# Patient Record
Sex: MALE | Race: WHITE
[De-identification: names, ages, dates, MRNs, and addresses within clinical notes are randomized per-mention and may not be internally consistent; named-entity substitution may affect disease eponyms.]

---

## 2017-03-27 ENCOUNTER — HOSPITAL ENCOUNTER (EMERGENCY)
Dept: HOSPITAL 39 - ER | Age: 57
Discharge: HOME | End: 2017-03-27
Payer: COMMERCIAL

## 2017-03-27 ENCOUNTER — HOSPITAL ENCOUNTER (OUTPATIENT)
Dept: HOSPITAL 39 - MRI | Age: 57
Discharge: HOME | End: 2017-03-27
Attending: FAMILY MEDICINE
Payer: COMMERCIAL

## 2017-03-27 VITALS — DIASTOLIC BLOOD PRESSURE: 88 MMHG | OXYGEN SATURATION: 97 % | SYSTOLIC BLOOD PRESSURE: 135 MMHG

## 2017-03-27 VITALS — TEMPERATURE: 98 F

## 2017-03-27 DIAGNOSIS — M51.16: Primary | ICD-10-CM

## 2017-03-27 DIAGNOSIS — Z86.14: ICD-10-CM

## 2017-03-27 DIAGNOSIS — M54.5: Primary | ICD-10-CM

## 2017-03-27 DIAGNOSIS — Z98.1: ICD-10-CM

## 2017-03-27 NOTE — MRI
Study: MRI of the Lumbar Spine. 



Indication: LOW BACK PAIN  



Technique: Multiplanar, multi sequence MRI of the lumbar spine

was obtained without intravenous contrast.

 

Comparison: February 23, 2016.



Findings:



L3-S1 bilateral pedicle screws with posterior vertical

stabilization rods as well as L3-L4, L4-L5, L5-S1 interbody

grafts and laminectomies redemonstrated. There is pronounced

susceptibility artifact.



No acute vertebral body fracture identified. No marrow

infiltrating lesion. Conus normal terminating at superior L1

level.



L1-L2: Trace retrolisthesis. Moderate disc space height loss and

disc desiccation with a 3 mm disc bulge. Mild bilateral neural

foraminal narrowing stable. No spinal canal narrowing. Stable

mild bilateral lateral recess narrowing.



L2-L3: Progressed severe disc space height loss and disc

desiccation with remodeling of the endplates. Discogenic endplate

changes suspected but difficult to evaluate due to the

susceptibility artifact. In addition, there does appear to be

progressive cystic change along the right posterolateral margin

of the L2 inferior endplate. A progressed 5 mm disc osteophyte

complex noted in addition moderate to severe bilateral facet

arthrosis and mild ligament and buckling. Prominent spurring of

the anteromedial margin right facet joint noted with marked

flattening of the thecal sac in the transverse dimension. There

is resultant moderate to severe spinal canal narrowing. Severe

bilateral neural foraminal narrowing noted as well.



L3-L4: No new complicating features.



L4-L5: Trace anterolisthesis with stable mild to moderate right

and mild left neural foraminal narrowing. No spinal canal

narrowing.



L5-S1: The posterior margin of the interbody graft extends into

the left neural foramen. There is severe left and at least

moderate to severe right neural foraminal narrowing. No spinal

canal narrowing.



Impression: 



L3-S1 fusion construct and posterior decompression as above with

associated susceptibility artifact which does degrade the

examination.



L5-S1 interbody graft extending into the left neural foramen with

severe left and moderate to severe right neural foraminal

narrowing.



L4-L5 stable mild-to-moderate right and mild left neural

foraminal narrowing.



L2-L3 progress disc disease as above with progressive moderate to

severe spinal canal narrowing and severe bilateral neural

foraminal narrowing. Endplate changes at this level are

nonspecific. Correlation with ESR and CRP recommended as

discitis-osteomyelitis not excluded.



Additional findings as above.



Electronically signed by:  Timo Palma MD  3/27/2017 3:36 PM CDT

## 2017-03-27 NOTE — ED.PDOC
History of Present Illness





- General


Chief Complaint: Back Pain or Injury


Stated Complaint: back pain hx of lumbar fractures


Time Seen by Provider: 03/27/17 14:45


Source: patient, RN notes reviewed, Vital Signs reviewed, family


Exam Limitations: no limitations





- History of Present Illness


Initial Comments: 


Patient with significant back history including 2 lumbar fusions has noticed 

over the past 5 weeks progressive worsening of his pain and weakness in his 

legs. He has some residual weakness in his L leg from lumbar surgery in 2012. 

He is now having numbness and weakness in his R leg. Can't walk due to the 

weakness, leg just seems to "flop around". Had MRI done today as an outpatient. 

Will change to STAT read to see what is going on in his back.


Timing/Duration: getting worse, changing over time


Quality/Severity: dullness, other - numbness


Back Pain Location: lumbar spine


Back Pain Radiation: upper legs, lower legs, feet


Method of Injury/Prior Injury: other - Hx of lumbar fusions L3-S1


Improving Factors: nothing


Worsening Factors: movement


Associated Symptoms: weakness, numbness in legs/feet, tingling in legs/feet, 

sensory/motor loss, lower back pain, other - no loss of bowel or bladder control


Allergies/Adverse Reactions: 


Allergies





NO KNOWN ALLERGY Allergy (Verified 03/27/17 15:26)


 








Home Medications: 


Ambulatory Orders





HYDROcodone 10MG/APAP 325MG [Norco 10/325] 1 ea PO 03/27/17 


Lyrica 600 mg PO BID 03/27/17 


Tramadol HCl [Tramadol HCl ER] 100 mg PO 03/27/17 


methylPREDNISolone TAB [Medrol Tab] 4 mg PO DAILY #1 pack 03/27/17 











Review of Systems





- Review of Systems


Constitutional: States: no symptoms reported


EENTM: States: no symptoms reported


Respiratory: States: no symptoms reported


Cardiology: States: no symptoms reported


Gastrointestinal/Abdominal: States: no symptoms reported


Genitourinary: States: no symptoms reported


Musculoskeletal: States: see HPI, back pain


Skin: States: no symptoms reported


Neurological: States: see HPI, numbness, paresthesia, pre-existing deficit, 

weakness


Endocrine: States: no symptoms reported


Hematologic/Lymphatic: States: no symptoms reported





Past Medical History (General)





- Patient Medical History


Hx Seizures: No


Hx Stroke: No


Hx Dementia: No


Hx Asthma: No


Hx of COPD: No


Hx Cardiac Disorders: No


Hx Congestive Heart Failure: No


Hx Pacemaker: No


Hx Hypertension: No


Hx Thyroid Disease: No


Hx Diabetes: No


Hx Gastroesophageal Reflux: No


Hx Renal Disease: No


Hx Cancer: No


Hx of HIV: No


Hx Hepatitis C: No


Hx MRSA: Yes


MRSA Source:: Wound





- Vaccination History


Hx Tetanus, Diphtheria Vaccination: Yes


Hx Influenza Vaccination: Yes


Hx Pneumococcal Vaccination: Yes


Immunizations Up to Date: No





- Social History


Hx Tobacco Use: No


Hx Chewing Tobacco Use: No


Hx Alcohol Use: No


Hx Substance Use: No


Hx Substance Use Treatment: No


Hx Depression: No


Feels Threatened In Home Enviroment: No


Feels Threatened In a Relationship: No


Hx Physical Abuse: No


Hx Emotional Abuse: No


Hx Suspected Abuse: No





- Female History


Patient is a Female of Child Bearing Age (10 -59 yrs old): No


Patient Pregnant: No





Family Medical History





- Family History


  ** Mother


Family History: No Known


Living Status: Unknown





Physical Exam





- Physical Exam


General Appearance: Alert, No apparent distress, Well Developed, Well Groomed, 

Well Hydrated, Well Nourished


Cardiovascular/Respiratory: regular rate, rhythm, no M/R/G, normal breath sounds

, no respiratory distress


Peripheral Pulses: dorsalis pedis,right: 2+, dorsalis pedis,left: 2+


Extremity Exam: no evidence of injury


Neurologic: alert, normal mood/affect, oriented x 3, motor weakness - 

Significant weakness of flexion/extension of R foot and leg 2/5, L 4/5, sensory 

deficit - decreased sensation to light touch Bilateral lower legs - diffusely., 

depressed affect, other - DTR's - R patellar 2+, L patellar 0 (unchanged per 

patient)


Skin Exam: normal color, warm/dry


Comments: 


 Vital Signs - 24 hr











  03/27/17





  15:03


 


Temperature 98 F


 


Pulse Rate [ 79





Left Radial] 


 


Respiratory 20





Rate 


 


Blood Pressure 155/99





[Left Arm] 


 


O2 Sat by Pulse 99





Oximetry 














Progress





- Progress


Progress: 


03/27/17 15:54


Placed call to Dr. Alatorre, patients Neurosurgeon, awaiting call back.





03/27/17 16:39


Second call placed, he is in surgery.





03/27/17 17:45


Discussed with Dr. Alatorre who agrees with rapid follow up with him. Recommended 

Decadron 6mg IV now, Medrol Dose Pk to start tomorrow and continue Lyrica 300mg 

TID. Patient is to call Dr. Alatorre' office in AM to schedule appt.





- Results/Orders


Results/Orders: 


MRI: L2-3 - moderate to severe spinal canal narrowing with severe bilateral 

neural foraminal narrowing.





Departure





- Departure


Clinical Impression: 


 Degeneration of lumbar intervertebral disc, Lumbar radiculopathy, acute


Time of Disposition: 17:48


Disposition: Discharge to Home or Self Care


Condition: Good


Departure Forms:  ED Discharge - Pt. Copy, Patient Portal Self Enrollment


Instructions:  DI for Low Back Pain


Diet: resume usual diet


Activity: increase activity as tolerated


Prescriptions: 


methylPREDNISolone TAB [Medrol Tab] 4 mg PO DAILY #1 pack


Home Medications: 


Ambulatory Orders





HYDROcodone 10MG/APAP 325MG [Norco 10/325] 1 ea PO 03/27/17 


Lyrica 600 mg PO BID 03/27/17 


Tramadol HCl [Tramadol HCl ER] 100 mg PO 03/27/17 


methylPREDNISolone TAB [Medrol Tab] 4 mg PO DAILY #1 pack 03/27/17 








Additional Instructions: 


Con't Lyrica


Call Dr. Alatorre' office in AM to schedule follow up.

## 2017-03-30 ENCOUNTER — HOSPITAL ENCOUNTER (OUTPATIENT)
Dept: HOSPITAL 39 - GMAL | Age: 57
Discharge: HOME | End: 2017-03-30
Attending: FAMILY MEDICINE
Payer: COMMERCIAL

## 2017-03-30 DIAGNOSIS — M25.50: Primary | ICD-10-CM

## 2017-12-08 ENCOUNTER — HOSPITAL ENCOUNTER (OUTPATIENT)
Dept: HOSPITAL 39 - GMAL | Age: 57
End: 2017-12-08
Attending: FAMILY MEDICINE
Payer: COMMERCIAL

## 2017-12-08 DIAGNOSIS — Z00.00: Primary | ICD-10-CM

## 2018-04-19 ENCOUNTER — HOSPITAL ENCOUNTER (OUTPATIENT)
Dept: HOSPITAL 39 - GMAL | Age: 58
End: 2018-04-19
Attending: FAMILY MEDICINE
Payer: COMMERCIAL

## 2018-04-19 DIAGNOSIS — E55.9: Primary | ICD-10-CM

## 2018-04-29 ENCOUNTER — HOSPITAL ENCOUNTER (EMERGENCY)
Dept: HOSPITAL 39 - ER | Age: 58
Discharge: HOME | End: 2018-04-29
Payer: COMMERCIAL

## 2018-04-29 VITALS — SYSTOLIC BLOOD PRESSURE: 153 MMHG | DIASTOLIC BLOOD PRESSURE: 91 MMHG | TEMPERATURE: 97.9 F

## 2018-04-29 VITALS — OXYGEN SATURATION: 100 %

## 2018-04-29 DIAGNOSIS — Z79.82: ICD-10-CM

## 2018-04-29 DIAGNOSIS — Z79.899: ICD-10-CM

## 2018-04-29 DIAGNOSIS — N13.2: ICD-10-CM

## 2018-04-29 DIAGNOSIS — Z98.1: ICD-10-CM

## 2018-04-29 DIAGNOSIS — M54.6: Primary | ICD-10-CM

## 2018-04-29 DIAGNOSIS — R19.5: ICD-10-CM

## 2018-04-29 DIAGNOSIS — J31.0: ICD-10-CM

## 2018-04-29 PROCEDURE — 85610 PROTHROMBIN TIME: CPT

## 2018-04-29 PROCEDURE — 82550 ASSAY OF CK (CPK): CPT

## 2018-04-29 PROCEDURE — 82553 CREATINE MB FRACTION: CPT

## 2018-04-29 PROCEDURE — 74177 CT ABD & PELVIS W/CONTRAST: CPT

## 2018-04-29 PROCEDURE — 85025 COMPLETE CBC W/AUTO DIFF WBC: CPT

## 2018-04-29 PROCEDURE — 93005 ELECTROCARDIOGRAM TRACING: CPT

## 2018-04-29 PROCEDURE — 80076 HEPATIC FUNCTION PANEL: CPT

## 2018-04-29 PROCEDURE — 81001 URINALYSIS AUTO W/SCOPE: CPT

## 2018-04-29 PROCEDURE — 84484 ASSAY OF TROPONIN QUANT: CPT

## 2018-04-29 PROCEDURE — 36415 COLL VENOUS BLD VENIPUNCTURE: CPT

## 2018-04-29 PROCEDURE — 82270 OCCULT BLOOD FECES: CPT

## 2018-04-29 PROCEDURE — 71046 X-RAY EXAM CHEST 2 VIEWS: CPT

## 2018-04-29 PROCEDURE — 80048 BASIC METABOLIC PNL TOTAL CA: CPT

## 2018-04-29 PROCEDURE — 85730 THROMBOPLASTIN TIME PARTIAL: CPT

## 2018-04-29 NOTE — ED.PDOC
History of Present Illness





- General


Chief Complaint: Back Pain or Injury


Stated Complaint: Severe Back Pain between shoulder blades


Time Seen by Provider: 04/29/18 09:37


Source: patient


Exam Limitations: no limitations





- History of Present Illness


Initial Comments: 





Hong Trinidad 58 y/o male stated that he had intermittent stabbing pain in 

between shoulder blades since last night and also left sided ache on his left 

abdomen same time,non radiating .No chest pain ,nausea/vomiting ,cough or 

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 

and last year lumbar spine fusion done.Denies heart problem ,high blood 

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 

passed it.Had N/V/D 3 days ago lasted for a day took Imodium for it.


Timing/Duration: 24 hours


Quality/Severity: sharpness


Back Pain Location: T-spine, lumbar spine


Back Pain Radiation: other - none


Method of Injury/Prior Injury: other - old injury


Improving Factors: rest


Worsening Factors: movement


Associated Symptoms: denies symptoms


Allergies/Adverse Reactions: 


Allergies





NO KNOWN ALLERGY Allergy (Verified 04/29/18 09:08)


 








Home Medications: 


Ambulatory Orders





HYDROcodone 10MG/APAP 325MG [[place] 10/325] 1 ea PO Q4HR 03/27/17 


Tramadol HCl [Tramadol HCl ER] 100 mg PO BID 03/27/17 


Acetamin W/Cod #3 Tab [Tylenol w/CODEINE #3] 2 ea PO Q6HRS PRN #20 tab 04/29/18 


Aspirin/Acetaminophen/Caffeine [Excedrin Migraine] 1 ea PO PRN PRN 04/29/18 


Escitalopram [Lexapro] 10 mg PO BEDTIME 04/29/18 


Pregabalin [Lyrica] 1 capsule PO BEDTIME 04/29/18 


Sucralfate Suspension [Carafate Suspension] 1 gm PO ACHS #300 ml 04/29/18 


Tamsulosin [Flomax] 0.4 mg PO QD #7 cap 04/29/18 


raNITIdine HCL [Zantac] 150 mg PO BID #60 tab 04/29/18 











Review of Systems





- Review of Systems


Constitutional: States: no symptoms reported


EENTM: States: nose congestion


Respiratory: States: no symptoms reported


Cardiology: States: no symptoms reported


Gastrointestinal/Abdominal: States: no symptoms reported


Genitourinary: States: no symptoms reported


Musculoskeletal: States: see HPI


Neurological: States: no symptoms reported


All other Systems: Reviewed and Negative, No Change from Baseline





Past Medical History (General)





- Patient Medical History


Hx Seizures: No


Hx Stroke: No


Hx Dementia: No


Hx Asthma: Yes - As a child


Hx of COPD: No


Hx Cardiac Disorders: No


Hx Congestive Heart Failure: No


Hx Pacemaker: No


Hx Hypertension: No


Hx Thyroid Disease: No


Hx Diabetes: No


Hx Gastroesophageal Reflux: No


Hx Renal Disease: No


Hx Cancer: No


Hx of HIV: No


Hx Hepatitis C: No


Hx MRSA: Yes


MRSA Source:: Wound


Surgical History: other - multiple lumbar sugery;ORIF right forearm fracture





- Vaccination History


Hx Tetanus, Diphtheria Vaccination: Yes


Hx Influenza Vaccination: No


Hx Pneumococcal Vaccination: Yes





- Social History


Hx Tobacco Use: No


Hx Chewing Tobacco Use: No


Hx Alcohol Use: Yes - Occas


Hx Substance Use: No


Hx Substance Use Treatment: No


Hx Depression: No


Feels Threatened In Home Enviroment: No


Feels Threatened In a Relationship: No


Hx Physical Abuse: No


Hx Emotional Abuse: No


Hx Suspected Abuse: No





- Female History


Patient Pregnant: No





Family Medical History





- Family History


  ** Mother


Family History: No Known


Living Status: Unknown


Hx Cardiac Disease: Yes - dad


Hx Family Cancer: Yes - breast-mom





Physical Exam





- Physical Exam


General Appearance: Alert, Comfortable, No apparent distress


Eyes, Ears, Nose, Throat Exam: normal ENT inspection


Neck Exam: non-tender, full range of motion, normal alignment, normal inspection


Cardiovascular/Respiratory: regular rate, rhythm, no M/R/G, normal peripheral 

pulses, no JVD


Peripheral Pulses: radial,right: 2+, radial,left: 2+


Gastrointestinal/Abdominal: normal bowel sounds, non tender, soft, no 

organomegaly


Back Exam: no CVA tenderness, no vertebral tenderness, muscle spasm, other - 

rectal exam-prostate moderately enlarged ;posive FOBT


Extremity Exam: normal range of motion, non-tender, no pedal edema


Neurologic: alert, oriented x 3, motor weakness - foot drop-left-residual from 

previous surgeries


Skin Exam: normal color, warm/dry





Progress





- Progress


Progress: 





04/29/18 09:50


 Vital Signs - 8 hr











  04/29/18





  09:29


 


Temperature 97.6 F


 


Pulse Rate [L 102 H





Arm] 


 


Respiratory 18





Rate 


 


Blood Pressure 144/100





[L Arm] 


 


O2 Sat by Pulse 100





Oximetry 














- EKG/XRAY/CT


EKG: Sinus, no ST T wave changes


Comments: NSR;HR-73


XRAY: chest - no acute cardiopulmonary process


CT Ordered: Yes - abd/p-3 mm UVJ right ureterolithiasis





Departure





- Departure


Clinical Impression: 


 Calculus of distal right ureter, Positive occult stool blood test, Rhinitis 

medicamentosa, History of back surgery





Back pain, acute


Qualifiers:


 Back pain location: thoracic back pain Back pain laterality: unspecified 

Qualified Code(s): M54.6 - Pain in thoracic spine





Time of Disposition: 12:48


Disposition: Discharge to Home or Self Care


Condition: Fair


Departure Forms:  ED Discharge - Pt. Copy, Patient Portal Self Enrollment


Instructions:  DI for Low Back Pain


Referrals: 


Oliver Hamm III, MD [Primary Care Provider] - 1-2 Weeks


Prescriptions: 


Acetamin W/Cod #3 Tab [Tylenol w/CODEINE #3] 2 ea PO Q6HRS PRN #20 tab


 PRN Reason: Pain


raNITIdine HCL [Zantac] 150 mg PO BID #60 tab


Sucralfate Suspension [Carafate Suspension] 1 gm PO ACHS #300 ml


Tamsulosin [Flomax] 0.4 mg PO QD #7 cap


Home Medications: 


Ambulatory Orders





HYDROcodone 10MG/APAP 325MG [[place] 10/325] 1 ea PO Q4HR 03/27/17 


Tramadol HCl [Tramadol HCl ER] 100 mg PO BID 03/27/17 


Acetamin W/Cod #3 Tab [Tylenol w/CODEINE #3] 2 ea PO Q6HRS PRN #20 tab 04/29/18 


Aspirin/Acetaminophen/Caffeine [Excedrin Migraine] 1 ea PO PRN PRN 04/29/18 


Escitalopram [Lexapro] 10 mg PO BEDTIME 04/29/18 


Pregabalin [Lyrica] 1 capsule PO BEDTIME 04/29/18 


Sucralfate Suspension [Carafate Suspension] 1 gm PO ACHS #300 ml 04/29/18 


Tamsulosin [Flomax] 0.4 mg PO QD #7 cap 04/29/18 


raNITIdine HCL [Zantac] 150 mg PO BID #60 tab 04/29/18 








Additional Instructions: 


Discontinue Excedrin;Taper down on Afrin Spray for your nasal congestion-to one 

spray each nostril once a day 3 days on 3 days off;Need to follow up with your 

primary Md for referral to ENT for your chronic nasal congestion from chronic 

afrin use;Continue with Flonase(fluticasone-otc) nose spray -2 each nostril at 

bedtime;May take either-Allegra,Zyrtec,Claritin (over the counter) for allergic 

rhinitis read direction on package insert;Also Follow up with your md for 

positive stool occult blood test to schedule you for egd/colonoscopy;Return to 

emergency room as needed;Might also need to see Urologist in am for your 

ureteral stone call your md's office for referral

## 2018-04-29 NOTE — CT
Procedure:  CT ABDOMEN PELVIS WITH IV CONTRAST        



Exam Date:  4/29/2018 11:21 AM CDT



Ordering Provider:  Virgilio Jennings



Clinical Indication:  left sided abdominal pain



Comparison: None 



TECHNIQUE: 

The abdomen and pelvis were scanned utilizing a multidetector

helical scanner from the diaphragm to the lesser trochanter . Low

osmolar  IV contrast was also given. Coronal and sagittal

reformations were obtained.



This exam was performed according to our departmental

dose-optimization program which includes automated exposure

control, adjustment of the mA and/or kV according to patient size

and/or use of iterative reconstruction technique.



DISCUSSION:



LOWER THORAX: Normal.



HEPATOBILIARY: Subcentimeter low-density foci in the right

hepatic lobe, statistically cysts or hemangiomas. No radiopaque

gallstone is present.

SPLEEN: No splenomegaly.

PANCREAS: No focal masses or ductal dilatation.



ADRENALS: No adrenal nodules.

KIDNEYS/URETERS: 3 mm calculus is present in the right

ureterovesical junction and results in mild right

hydroureteronephrosis.



There is a 2 mm nonobstructing stone in the upper pole of the

left kidney.

PELVIC ORGANS/BLADDER: There is vague nodular appearance of the

superior segment possibly from median lobe hypertrophy or

neoplasm.



PERITONEUM / RETROPERITONEUM: No free air or fluid.

LYMPH NODES: No lymphadenopathy.

VESSELS: Unremarkable.



GI TRACT: Stomach and small bowel are unremarkable. There is

descending and sigmoid diverticulosis without diverticulitis.



BONES AND SOFT TISSUES: Extensive postoperative changes are seen

in the lumbar spine. There is L1-L2 grade 1 anterolisthesis.



IMPRESSION:



3 mm obstructing stone at the right ureterovesical junction

results in mild right hydroureteronephrosis.



Punctate nonobstructing stone in the left kidney.



Colonic diverticulosis without diverticulitis.



Electronically signed by:  Mele Naidu MD  4/29/2018 11:53 AM

CDT Workstation: [id number]

## 2018-05-02 ENCOUNTER — HOSPITAL ENCOUNTER (OUTPATIENT)
Dept: HOSPITAL 39 - GMAM | Age: 58
End: 2018-05-02
Attending: FAMILY MEDICINE
Payer: COMMERCIAL

## 2018-05-02 DIAGNOSIS — N20.1: Primary | ICD-10-CM

## 2018-05-19 ENCOUNTER — HOSPITAL ENCOUNTER (EMERGENCY)
Dept: HOSPITAL 39 - ER | Age: 58
Discharge: HOME | End: 2018-05-19
Payer: COMMERCIAL

## 2018-05-19 VITALS — TEMPERATURE: 99.2 F

## 2018-05-19 VITALS — DIASTOLIC BLOOD PRESSURE: 73 MMHG | OXYGEN SATURATION: 97 % | SYSTOLIC BLOOD PRESSURE: 137 MMHG

## 2018-05-19 DIAGNOSIS — N20.0: ICD-10-CM

## 2018-05-19 DIAGNOSIS — K57.32: Primary | ICD-10-CM

## 2018-05-19 DIAGNOSIS — Z87.11: ICD-10-CM

## 2018-05-19 DIAGNOSIS — K52.9: ICD-10-CM

## 2018-05-19 DIAGNOSIS — Z87.09: ICD-10-CM

## 2018-05-19 DIAGNOSIS — Z86.14: ICD-10-CM

## 2018-05-19 DIAGNOSIS — Z79.899: ICD-10-CM

## 2018-05-19 DIAGNOSIS — Z79.82: ICD-10-CM

## 2018-05-19 PROCEDURE — 87040 BLOOD CULTURE FOR BACTERIA: CPT

## 2018-05-19 PROCEDURE — 85610 PROTHROMBIN TIME: CPT

## 2018-05-19 PROCEDURE — 74177 CT ABD & PELVIS W/CONTRAST: CPT

## 2018-05-19 PROCEDURE — 82553 CREATINE MB FRACTION: CPT

## 2018-05-19 PROCEDURE — 36415 COLL VENOUS BLD VENIPUNCTURE: CPT

## 2018-05-19 PROCEDURE — 84484 ASSAY OF TROPONIN QUANT: CPT

## 2018-05-19 PROCEDURE — 86140 C-REACTIVE PROTEIN: CPT

## 2018-05-19 PROCEDURE — 84443 ASSAY THYROID STIM HORMONE: CPT

## 2018-05-19 PROCEDURE — 85025 COMPLETE CBC W/AUTO DIFF WBC: CPT

## 2018-05-19 PROCEDURE — 85730 THROMBOPLASTIN TIME PARTIAL: CPT

## 2018-05-19 PROCEDURE — 81001 URINALYSIS AUTO W/SCOPE: CPT

## 2018-05-19 PROCEDURE — 73502 X-RAY EXAM HIP UNI 2-3 VIEWS: CPT

## 2018-05-19 PROCEDURE — 85379 FIBRIN DEGRADATION QUANT: CPT

## 2018-05-19 PROCEDURE — 83605 ASSAY OF LACTIC ACID: CPT

## 2018-05-19 PROCEDURE — 82550 ASSAY OF CK (CPK): CPT

## 2018-05-19 PROCEDURE — 80053 COMPREHEN METABOLIC PANEL: CPT

## 2018-05-19 PROCEDURE — 74019 RADEX ABDOMEN 2 VIEWS: CPT

## 2018-05-19 NOTE — ED.PDOC
History of Present Illness





- General


Chief Complaint: Abdominal Pain


Stated Complaint: left hip, left abdominal pain


Time Seen by Provider: 05/19/18 08:23


Source: patient


Exam Limitations: no limitations





- History of Present Illness


Initial Comments: 





the patient's a 57-year-old  male presenting to the emergency room 

secondary to increasing left inguinal pain over the last week.  The patient 

does take multiple pain medications that do have Tylenol in them.  In spite of 

this he does have a low-grade fever here today.  He has not had any difficulty 

with bowel movements.  No difficulties with urination.  Pain is just proximal 

to the left inguinal ligament.  No real rebound or peritoneal signs.  He has 

not been having any hypotension.  No altered mental status.  Of note, 3 weeks 

ago he did have a bleeding ulcer in his stomach that he had to have cauterized.

  No evidence of any rebleed since that time.  No dizziness.  No chest pain or 

shortness of breath.


Timing/Duration: 1 week


Severity: moderate


Improving Factors: nothing


Worsening Factors: nothing


Associated Symptoms: denies symptoms


Allergies/Adverse Reactions: 


Allergies





NO KNOWN ALLERGY Allergy (Verified 04/29/18 09:08)


 








Home Medications: 


Ambulatory Orders





HYDROcodone 10MG/APAP 325MG [[place] 10/325] 1 ea PO Q4HR 03/27/17 


Tramadol HCl [Tramadol HCl ER] 100 mg PO BID 03/27/17 


Acetamin W/Cod #3 Tab [Tylenol w/CODEINE #3] 2 ea PO Q6HRS PRN #20 tab 04/29/18 


Aspirin/Acetaminophen/Caffeine [Excedrin Migraine] 1 ea PO PRN PRN 04/29/18 


Escitalopram [Lexapro] 10 mg PO BEDTIME 04/29/18 


Pregabalin [Lyrica] 1 capsule PO BEDTIME 04/29/18 


Sucralfate Suspension [Carafate Suspension] 1 gm PO ACHS #300 ml 04/29/18 


Tamsulosin [Flomax] 0.4 mg PO QD #7 cap 04/29/18 


raNITIdine HCL [Zantac] 150 mg PO BID #60 tab 04/29/18 


Ciprofloxacin [Cipro] 500 mg PO BID #20 tab 05/19/18 


Metronidazole 500 mg PO TID #30 tab 05/19/18 











Review of Systems





- Review of Systems


Constitutional: States: no symptoms reported


EENTM: States: no symptoms reported


Respiratory: States: no symptoms reported


Cardiology: States: no symptoms reported


Gastrointestinal/Abdominal: States: see HPI.  Denies: constipation, diarrhea, 

nausea, vomiting


Genitourinary: States: no symptoms reported


Musculoskeletal: States: see HPI


Skin: States: no symptoms reported


Neurological: States: no symptoms reported


Endocrine: States: no symptoms reported


All other Systems: No Change from Baseline





Past Medical History (General)





- Patient Medical History


Hx Seizures: No


Hx Stroke: No


Hx Dementia: No


Hx Asthma: Yes - As a child


Hx of COPD: No


Hx Cardiac Disorders: No


Hx Congestive Heart Failure: No


Hx Pacemaker: No


Hx Hypertension: No


Hx Thyroid Disease: No


Hx Diabetes: No


Hx Gastroesophageal Reflux: No


Hx Renal Disease: No


Hx Cancer: No


Hx of HIV: No


Hx Hepatitis C: No


Hx MRSA: Yes


MRSA Source:: Wound


Surgical History: other





- Vaccination History


Hx Tetanus, Diphtheria Vaccination: Yes


Hx Influenza Vaccination: No


Hx Pneumococcal Vaccination: Yes





- Social History


Hx Tobacco Use: No


Hx Chewing Tobacco Use: No


Hx Alcohol Use: Yes - Occas


Hx Substance Use: No


Hx Substance Use Treatment: No


Hx Depression: No


Hx Physical Abuse: No


Hx Emotional Abuse: No


Hx Suspected Abuse: No





- Female History


Patient Pregnant: No





Family Medical History





- Family History


  ** Mother


Family History: No Known


Living Status: Unknown


Hx Cardiac Disease: Yes - dad


Hx Family Cancer: Yes - breast-mom





Physical Exam





- Physical Exam


General Appearance: Alert, Comfortable, No apparent distress


Eye Exam: bilateral normal


Ears, Nose, Throat: hearing grossly normal, normal ENT inspection, normal 

pharynx


Neck: full range of motion, supple, normal inspection


Respiratory: lungs clear, normal breath sounds, no respiratory distress, no 

accessory muscle use


Cardiovascular/Chest: normal peripheral pulses, regular rate, rhythm, no edema


Peripheral Pulses: radial,right: 2+, radial,left: 2+, dorsalis pedis,right: 2+, 

dorsalis pedis,left: 2+


Gastrointestinal/Abdominal: soft, other - mild to moderateextreme left lower 

discomfort to palpation


Rectal Exam: deferred


Back Exam: other - hronic changes only


Extremity: normal range of motion, non-tender, normal inspection, no pedal edema


Neurologic: CNs II-XII nml as tested, alert, normal mood/affect, oriented x 3


Skin Exam: normal color


Comments: 





 Vital Signs - 24 hr











  05/19/18 05/19/18 05/19/18





  08:38 09:42 10:42


 


Temperature 100.4 F H 99.6 F 99.2 F


 


Pulse Rate [ 90 86 97 H





left brachial]   


 


Respiratory 16 16 18





Rate   


 


Blood Pressure 154/101 147/84 138/91





[left brachial]   


 


O2 Sat by Pulse 99 98 99





Oximetry   














Progress





- Progress


Progress: 





05/19/18 11:16


the patient's a 57-year-old  male presenting to the emergency room 

secondary to left inguinal pain progressive over the last week.  CT scan seems 

to indicate a sigmoid colitis versus early diverticulitis.  The patient is 

going to be placed on ciprofloxacin and Flagyl.  Vital signs have remained 

stable.  Clinically the patient does not appear to be septic.  No evidence of 

any abscess formation.  Differential is normal.  His white blood cell count is 

mildly low.  He does need follow-up with his primary care doctor for a repeat 

CBC in 1-2 weeks to make sure that his white blood cell count is rebounding and 

he also has some nodularity noted on the CT scan of his prostate that needs to 

be followed up with his primary care doctor as well.  Of note he did have a 

normal PSA approximately 7 months ago.  No urinary symptoms at this time.  ER 

warnings were given for any worsening.





- Results/Orders


Results/Orders: 





 Laboratory Tests











  05/19/18 05/19/18 05/19/18





  09:07 09:07 09:07


 


WBC   3.5 L 


 


RBC   3.66 L 


 


Hgb   11.1 L 


 


Hct   33.1 L 


 


MCV   90.3 


 


MCH   30.3 


 


MCHC   33.5 


 


RDW   14.9 H 


 


Plt Count   314 


 


MPV   8.6 


 


Absolute Neuts (auto)   1.90 


 


Absolute Lymphs (auto)   1.00 


 


Absolute Monos (auto)   0.40 


 


Absolute Eos (auto)   0.10 


 


Absolute Basos (auto)   0.10 


 


Neutrophils %   55.6 


 


Lymphocytes %   28.1 


 


Monocytes %   11.0 H 


 


Eosinophils %   3.5 


 


Basophils %   1.8 


 


PT    12.5


 


INR    1.080


 


PTT (SP)    31.5


 


D-Dimer, Quantitative   


 


Sodium  139  


 


Potassium  3.7  


 


Chloride  107  


 


Carbon Dioxide  26  


 


Anion Gap  9.7 L  


 


BUN  20 H  


 


Creatinine  0.80  


 


BUN/Creatinine Ratio  25.0 H  


 


Random Glucose  145 H  


 


Serum Osmolality  282.7  


 


Lactic Acid   


 


Calcium  9.0  


 


Total Bilirubin  0.2  


 


AST  15  


 


ALT  13  


 


Alkaline Phosphatase  81  


 


Creatine Kinase  36 L  


 


CK-MB (CK-2)  2.4  


 


CK-MB (CK-2) %  Not Reportable  


 


Troponin I  < 0.02  


 


C-Reactive Protein   


 


Serum Total Protein  6.7  


 


Albumin  3.8  


 


Globulin  2.9  


 


Albumin/Globulin Ratio  1.3  


 


TSH  0.33 L  


 


Urine Color   


 


Urine Appearance   


 


Urine pH   


 


Ur Specific Gravity   


 


Urine Protein   


 


Urine Glucose (UA)   


 


Urine Ketones   


 


Urine Blood   


 


Urine Nitrite   


 


Urine Bilirubin   


 


Urine Urobilinogen   


 


Ur Leukocyte Esterase   


 


Urine RBC   


 


Urine WBC   


 


Ur Epithelial Cells   


 


Urine Bacteria   














  05/19/18 05/19/18 05/19/18





  09:07 09:07 09:39


 


WBC   


 


RBC   


 


Hgb   


 


Hct   


 


MCV   


 


MCH   


 


MCHC   


 


RDW   


 


Plt Count   


 


MPV   


 


Absolute Neuts (auto)   


 


Absolute Lymphs (auto)   


 


Absolute Monos (auto)   


 


Absolute Eos (auto)   


 


Absolute Basos (auto)   


 


Neutrophils %   


 


Lymphocytes %   


 


Monocytes %   


 


Eosinophils %   


 


Basophils %   


 


PT   


 


INR   


 


PTT (SP)   


 


D-Dimer, Quantitative   


 


Sodium   


 


Potassium   


 


Chloride   


 


Carbon Dioxide   


 


Anion Gap   


 


BUN   


 


Creatinine   


 


BUN/Creatinine Ratio   


 


Random Glucose   


 


Serum Osmolality   


 


Lactic Acid  1.6  


 


Calcium   


 


Total Bilirubin   


 


AST   


 


ALT   


 


Alkaline Phosphatase   


 


Creatine Kinase   


 


CK-MB (CK-2)   


 


CK-MB (CK-2) %   


 


Troponin I   


 


C-Reactive Protein   < 0.5 


 


Serum Total Protein   


 


Albumin   


 


Globulin   


 


Albumin/Globulin Ratio   


 


TSH   


 


Urine Color    Yellow


 


Urine Appearance    Clear


 


Urine pH    5.5


 


Ur Specific Gravity    >= 1.030


 


Urine Protein    Negative


 


Urine Glucose (UA)    Negative


 


Urine Ketones    Negative


 


Urine Blood    Negative


 


Urine Nitrite    Negative


 


Urine Bilirubin    Negative


 


Urine Urobilinogen    0.2


 


Ur Leukocyte Esterase    Negative


 


Urine RBC    0-1


 


Urine WBC    0-1


 


Ur Epithelial Cells    0


 


Urine Bacteria    0














  05/19/18





  09:39


 


WBC 


 


RBC 


 


Hgb 


 


Hct 


 


MCV 


 


MCH 


 


MCHC 


 


RDW 


 


Plt Count 


 


MPV 


 


Absolute Neuts (auto) 


 


Absolute Lymphs (auto) 


 


Absolute Monos (auto) 


 


Absolute Eos (auto) 


 


Absolute Basos (auto) 


 


Neutrophils % 


 


Lymphocytes % 


 


Monocytes % 


 


Eosinophils % 


 


Basophils % 


 


PT 


 


INR 


 


PTT (SP) 


 


D-Dimer, Quantitative  < 200


 


Sodium 


 


Potassium 


 


Chloride 


 


Carbon Dioxide 


 


Anion Gap 


 


BUN 


 


Creatinine 


 


BUN/Creatinine Ratio 


 


Random Glucose 


 


Serum Osmolality 


 


Lactic Acid 


 


Calcium 


 


Total Bilirubin 


 


AST 


 


ALT 


 


Alkaline Phosphatase 


 


Creatine Kinase 


 


CK-MB (CK-2) 


 


CK-MB (CK-2) % 


 


Troponin I 


 


C-Reactive Protein 


 


Serum Total Protein 


 


Albumin 


 


Globulin 


 


Albumin/Globulin Ratio 


 


TSH 


 


Urine Color 


 


Urine Appearance 


 


Urine pH 


 


Ur Specific Gravity 


 


Urine Protein 


 


Urine Glucose (UA) 


 


Urine Ketones 


 


Urine Blood 


 


Urine Nitrite 


 


Urine Bilirubin 


 


Urine Urobilinogen 


 


Ur Leukocyte Esterase 


 


Urine RBC 


 


Urine WBC 


 


Ur Epithelial Cells 


 


Urine Bacteria 








acute abdominal series shows moderate constipation. CT scan abdomen and pelvis 

shows sigmoid colitis and possible mild diverticulitis.  He does have a 

nonobstructing 3 mm right kidney stone at the ureterovesicular junction that is 

not obstructing.  He also has some prostate nodularity.





Departure





- Departure


Clinical Impression: 


Diverticulitis


Qualifiers:


 Diverticulitis site: large intestine Diverticulitis bleeding: without bleeding 

Diverticulitis complication: without perforation or abscess Qualified Code(s): 

K57.32 - Diverticulitis of large intestine without perforation or abscess 

without bleeding





Disposition: Discharge to Home or Self Care


Condition: Fair


Departure Forms:  ED Discharge - Pt. Copy, Patient Portal Self Enrollment


Instructions:  DI for Diverticulitis


Diet: regular diet


Activity: increase activity as tolerated


Referrals: 


Oliver Hamm III, MD [Primary Care Provider] - 1-2 Weeks


Prescriptions: 


Ciprofloxacin [Cipro] 500 mg PO BID #20 tab


Metronidazole 500 mg PO TID #30 tab


Home Medications: 


Ambulatory Orders





HYDROcodone 10MG/APAP 325MG [[place] 10/325] 1 ea PO Q4HR 03/27/17 


Tramadol HCl [Tramadol HCl ER] 100 mg PO BID 03/27/17 


Acetamin W/Cod #3 Tab [Tylenol w/CODEINE #3] 2 ea PO Q6HRS PRN #20 tab 04/29/18 


Aspirin/Acetaminophen/Caffeine [Excedrin Migraine] 1 ea PO PRN PRN 04/29/18 


Escitalopram [Lexapro] 10 mg PO BEDTIME 04/29/18 


Pregabalin [Lyrica] 1 capsule PO BEDTIME 04/29/18 


Sucralfate Suspension [Carafate Suspension] 1 gm PO ACHS #300 ml 04/29/18 


Tamsulosin [Flomax] 0.4 mg PO QD #7 cap 04/29/18 


raNITIdine HCL [Zantac] 150 mg PO BID #60 tab 04/29/18 


Ciprofloxacin [Cipro] 500 mg PO BID #20 tab 05/19/18 


Metronidazole 500 mg PO TID #30 tab 05/19/18 








Additional Instructions: 


the patient's a 57-year-old  male presenting to the emergency room 

secondary to left inguinal pain progressive over the last week.  CT scan seems 

to indicate a sigmoid colitis versus early diverticulitis.  The patient is 

going to be placed on ciprofloxacin and Flagyl.  Vital signs have remained 

stable.  Clinically the patient does not appear to be septic.  No evidence of 

any abscess formation.  Differential is normal.  His white blood cell count is 

mildly low.  He does need follow-up with his primary care doctor for a repeat 

CBC in 1-2 weeks to make sure that his white blood cell count is rebounding and 

he also has some nodularity noted on the CT scan of his prostate that needs to 

be followed up with his primary care doctor as well.  Of note he did have a 

normal PSA approximately 7 months ago.  No urinary symptoms at this time.  ER 

warnings were given for any worsening.

## 2018-05-19 NOTE — RAD
Procedure:  X-ray HIP 2 OR MORE VIEWS        



Exam Date:  5/19/2018 8:42 AM CDT



Ordering Provider:  Hever Shanks



Clinical Indication:  fever, left inguinal pain



Comparison: None 



Findings: 



No fracture, focal osseous destruction, or malalignment. Joint

spaces are preserved. Soft tissues are unremarkable. Partially

imaged postoperative changes in the lumbosacral spine.





IMPRESSION:



No acute osseous abnormality. 









Procedure:  XR ABDOMEN 2 VIEWS SUPINE AND ERECT



Exam Date:  5/19/2018 8:42 AM CDT



Ordering Provider:  Hever Shanks



Clinical Indication:  fever, left inguinal pain



Comparison: None 



Findings:



Lungs are clear. Heart size is within normal limits. 



Abdomen views show non-obstructive bowel gas pattern. No

pneumoperitoneum. Large volume stool burden. Postoperative

changes in the lumbosacral spine related to anterior and

posterior spinal fusion.



Impression:



 No acute pulmonary process.



Nonobstructive bowel gas pattern.



Large amount of stool in the colon. Correlate for constipation.



Electronically signed by:  Mele Naidu MD  5/19/2018 9:10 AM CDT

Workstation: [id number]

## 2018-05-19 NOTE — RAD
Procedure:  X-ray HIP 2 OR MORE VIEWS        



Exam Date:  5/19/2018 8:42 AM CDT



Ordering Provider:  Hever Shanks



Clinical Indication:  fever, left inguinal pain



Comparison: None 



Findings: 



No fracture, focal osseous destruction, or malalignment. Joint

spaces are preserved. Soft tissues are unremarkable. Partially

imaged postoperative changes in the lumbosacral spine.





IMPRESSION:



No acute osseous abnormality. 









Procedure:  XR ABDOMEN 2 VIEWS SUPINE AND ERECT



Exam Date:  5/19/2018 8:42 AM CDT



Ordering Provider:  Hever Shanks



Clinical Indication:  fever, left inguinal pain



Comparison: None 



Findings:



Lungs are clear. Heart size is within normal limits. 



Abdomen views show non-obstructive bowel gas pattern. No

pneumoperitoneum. Large volume stool burden. Postoperative

changes in the lumbosacral spine related to anterior and

posterior spinal fusion.



Impression:



 No acute pulmonary process.



Nonobstructive bowel gas pattern.



Large amount of stool in the colon. Correlate for constipation.



Electronically signed by:  Mele Naidu MD  5/19/2018 9:10 AM CDT

Workstation: [id number]

## 2018-05-19 NOTE — CT
Procedure:  CT ABDOMEN PELVIS WITH IV CONTRAST        



Exam Date:  5/19/2018 10:23 AM CDT



Ordering Provider:  Hever Shanks



Clinical Indication:  left lower abd and inguinal pain



Comparison: April 29, 2018 



TECHNIQUE: 

The abdomen and pelvis were scanned utilizing a multidetector

helical scanner from the diaphragm to the lesser trochanter . Low

osmolar  IV contrast was also given. Coronal and sagittal

reformations were obtained.



This exam was performed according to our departmental

dose-optimization program which includes automated exposure

control, adjustment of the mA and/or kV according to patient size

and/or use of iterative reconstruction technique.



DISCUSSION:



LOWER THORAX: Normal.



HEPATOBILIARY: Subcentimeter low-density foci in the right

hepatic lobe, statistically cysts or hemangiomas. No radiopaque

gallstone is present.

SPLEEN: No splenomegaly.

PANCREAS: No focal masses or ductal dilatation.



ADRENALS: No adrenal nodules.

KIDNEYS/URETERS: 3 mm calculus is present in the right

ureterovesical junction and results in no significant

hydroureteronephrosis.



There is a 2 mm nonobstructing stone in the upper pole of the

left kidney.

PELVIC ORGANS/BLADDER: There is vague nodular appearance of the

superior segment possibly from median lobe hypertrophy or

neoplasm.



PERITONEUM / RETROPERITONEUM: No free air or fluid.

LYMPH NODES: No lymphadenopathy.

VESSELS: Unremarkable.



GI TRACT: Stomach and small bowel are unremarkable. There is

descending and sigmoid diverticulosis without diverticulitis.

There is mild circumferential wall thickening of the sigmoid

colon.



BONES AND SOFT TISSUES: Extensive postoperative changes are seen

in the lumbar spine. There is L1-L2 grade 1 anterolisthesis.



IMPRESSION:



Chronically obstructed 3 mm calculus at the right UVJ. No

significant hydroureteronephrosis.



Punctate nonobstructing stone in the left kidney.



Mild circumferential wall thickening of the sigmoid colon but

there is no significant surrounding inflammation. Findings are

seen on a background of diverticulosis. These findings could be

related to subclinical/low-grade diverticulitis.



Somewhat nodular appearance of the prostate gland superiorly.

Correlate with physical exam.



Electronically signed by:  Mele Naidu MD  5/19/2018 10:58 AM

CDT Workstation: [id number]

## 2018-05-30 ENCOUNTER — HOSPITAL ENCOUNTER (OUTPATIENT)
Dept: HOSPITAL 39 - GMAJ | Age: 58
End: 2018-05-30
Attending: FAMILY MEDICINE
Payer: COMMERCIAL

## 2018-05-30 DIAGNOSIS — D50.8: Primary | ICD-10-CM

## 2018-06-19 ENCOUNTER — HOSPITAL ENCOUNTER (OUTPATIENT)
Dept: HOSPITAL 39 - GMAL | Age: 58
End: 2018-06-19
Attending: FAMILY MEDICINE
Payer: COMMERCIAL

## 2018-06-19 DIAGNOSIS — D50.8: Primary | ICD-10-CM

## 2019-02-08 ENCOUNTER — HOSPITAL ENCOUNTER (OUTPATIENT)
Dept: HOSPITAL 39 - RAD | Age: 59
End: 2019-02-08
Attending: ORTHOPAEDIC SURGERY
Payer: COMMERCIAL

## 2019-02-08 DIAGNOSIS — M25.551: ICD-10-CM

## 2019-02-08 DIAGNOSIS — M25.552: ICD-10-CM

## 2019-02-08 DIAGNOSIS — M16.0: Primary | ICD-10-CM

## 2019-02-08 NOTE — RAD
EXAM DESCRIPTION: Pelvis



CLINICAL HISTORY: 58 years Male, PAIN IN LEFT HIP



COMPARISON: None.



TECHNIQUE: AP radiograph of the pelvis was performed.



FINDINGS: The pelvic ring appears grossly intact on this single

AP radiograph. No acute fracture or dislocation. Bilateral

sacroiliac joints appear normal. Mild degenerative changes are

identified in bilateral hip joints. The visualized lumbo-sacral

spine demonstrates mild degenerative changes.



IMPRESSION:

Single AP radiograph of the pelvis demonstrates grossly intact

pelvic ring. Mild bilateral hip osteoarthritis. 



Electronically signed by:  Pat Barlow MD  2/8/2019 10:55 AM

[facility] Workstation: [id number]

## 2019-04-15 ENCOUNTER — HOSPITAL ENCOUNTER (OUTPATIENT)
Dept: HOSPITAL 39 - GMAJ | Age: 59
End: 2019-04-15
Attending: FAMILY MEDICINE
Payer: COMMERCIAL

## 2019-04-15 DIAGNOSIS — Z00.00: Primary | ICD-10-CM

## 2019-07-01 ENCOUNTER — HOSPITAL ENCOUNTER (OUTPATIENT)
Dept: HOSPITAL 39 - LAB.O | Age: 59
End: 2019-07-01
Attending: ORTHOPAEDIC SURGERY
Payer: COMMERCIAL

## 2019-07-01 DIAGNOSIS — M17.0: Primary | ICD-10-CM

## 2019-09-05 ENCOUNTER — HOSPITAL ENCOUNTER (OUTPATIENT)
Dept: HOSPITAL 39 - RAD | Age: 59
End: 2019-09-05
Attending: ORTHOPAEDIC SURGERY
Payer: COMMERCIAL

## 2019-09-05 DIAGNOSIS — M47.892: Primary | ICD-10-CM

## 2019-09-05 DIAGNOSIS — Z98.890: ICD-10-CM

## 2019-09-05 DIAGNOSIS — M19.041: ICD-10-CM

## 2019-09-05 DIAGNOSIS — M19.042: ICD-10-CM

## 2019-09-05 DIAGNOSIS — M47.896: ICD-10-CM

## 2019-09-05 DIAGNOSIS — M18.52: ICD-10-CM

## 2019-09-05 NOTE — RAD
NECK PAIN

PROVIDED CLINICAL HISTORY/REASON FOR EXAM: NECK PAIN 



Findings: 



Number of images: 5 



Location: Cervical spine



C1- C7 demonstrated on the lateral view. The lung apices are

unremarkable. Prevertebral soft tissues are unremarkable.



No acute fracture or subluxation. Vertebral body heights are

maintained. Moderate multilevel degenerative disc disease. 

C3/C4 disc space narrowing with endplate degenerative change, and

facet/uncinate process hypertrophy.

C4/C5 disc space narrowing with endplate degenerative change,

marginal osteophytosis and facet/uncinate process hypertrophy.

C5/C6 facet and uncinate process hypertrophy.

C6/C7 where disc space narrowing with endplate degenerative

change, marginal osteophytosis and facet/uncinate process

hypertrophy.



IMPRESSION:

Moderate multilevel cervical spondylosis. No acute fracture or

subluxation. 



Electronically signed by:  Jackson Whitney MD  9/5/2019 10:57 AM

CDT Workstation: [id number]

## 2019-09-05 NOTE — RAD
EXAM DESCRIPTION: 



Lumbar Spine 5 Views



CLINICAL HISTORY: 



LOW BACK PAIN



COMPARISON: 



Lumbar magnetic resonance imaging dated 27th of March 2017



TECHNIQUE: 



5 views



FINDINGS: 



Pedicle screw and interbody fusion is observed at the L2-3 L3-4

L4-5 and L5-S1 levels. Marked endplate degenerative changes are

observed at the L1-2 level. No hardware failure is detected.

Extensive laminectomy is observed from L2 through L5.



IMPRESSION: 



Extensive spine surgery is observed from L2 through S1. Advanced

degenerative changes are observed at the L1-2 level.



Electronically signed by:  Olvier Hirsch MD  9/5/2019 11:08 AM

CDT Workstation: [id number]

## 2019-09-05 NOTE — RAD
Frontal, lateral, and oblique views of the left hand.



Indication:HAND PAIN



Comparison: None.



Impression:



No acute fracture. If there is persistent anatomic snuffbox

tenderness, repeat wrist imaging to include a scaphoid view is

recommended in one week to evaluate for occult scaphoid fracture.



Moderate to severe narrowing throughout the PIP and DIP joints of

the hand and most pronounced at the second and fifth PIP joints

where there is mild gull wing deformities consistent with erosive

osteoarthritis. Associated osteophytes at these joints.



No marginal osseous erosions identified.



Moderate osteoarthritis first CMC joint.



Soft tissues are intact without radiopaque foreign body.



Electronically signed by:  Timo Palma MD  9/5/2019 10:58 AM CDT

Workstation: [id number]

## 2019-09-05 NOTE — RAD
EXAM DESCRIPTION: 



Hand,Right 3 Views



CLINICAL HISTORY: 



HAND PAIN



COMPARISON: 



None.



TECHNIQUE: 



3 views right



FINDINGS: 



Distal and proximal interphalangeal joint arthritis is observed

throughout the hand. Metacarpal phalangeal joint arthritis is

observed in the second through third digits. The exam reveals a:

Aeration of the ulnar side carpals. This may be the result of

prior surgical fusion. No scaphoid is identified and I presume is

been previously resected. Radiocarpal arthritis is observed.



IMPRESSION: 



Arthritic and presumed postsurgical changes are observed. No

acute injury is seen.



Electronically signed by:  Oliver Hirsch MD  9/5/2019 11:01 AM

CDT Workstation: [id number]

## 2019-12-05 ENCOUNTER — HOSPITAL ENCOUNTER (EMERGENCY)
Dept: HOSPITAL 39 - ER | Age: 59
Discharge: HOME | End: 2019-12-05
Payer: COMMERCIAL

## 2019-12-05 VITALS — TEMPERATURE: 98.4 F | OXYGEN SATURATION: 95 % | DIASTOLIC BLOOD PRESSURE: 95 MMHG | SYSTOLIC BLOOD PRESSURE: 133 MMHG

## 2019-12-05 DIAGNOSIS — K76.89: ICD-10-CM

## 2019-12-05 DIAGNOSIS — F11.20: ICD-10-CM

## 2019-12-05 DIAGNOSIS — N20.0: ICD-10-CM

## 2019-12-05 DIAGNOSIS — N21.0: ICD-10-CM

## 2019-12-05 DIAGNOSIS — K29.00: Primary | ICD-10-CM

## 2019-12-05 DIAGNOSIS — K57.30: ICD-10-CM

## 2019-12-05 DIAGNOSIS — E86.0: ICD-10-CM

## 2019-12-05 PROCEDURE — 36415 COLL VENOUS BLD VENIPUNCTURE: CPT

## 2019-12-05 PROCEDURE — 85025 COMPLETE CBC W/AUTO DIFF WBC: CPT

## 2019-12-05 PROCEDURE — 81001 URINALYSIS AUTO W/SCOPE: CPT

## 2019-12-05 PROCEDURE — 74177 CT ABD & PELVIS W/CONTRAST: CPT

## 2019-12-05 PROCEDURE — 80053 COMPREHEN METABOLIC PANEL: CPT

## 2019-12-05 PROCEDURE — 83690 ASSAY OF LIPASE: CPT

## 2019-12-05 NOTE — CT
EXAM DESCRIPTION:

Abdomen/Pelvis w/Contrast



CLINICAL HISTORY:

58 years Male, n/v, po intolerance, f/c, h/o diverticulosis



COMPARISON:

19 mGy 2018



TECHNIQUE:

Transaxial images were obtained without intravenous contrast

medium with oral contrast media. Sagittal and coronal

reconstruction was performed.This exam was performed according to

our departmental dose-optimization program, which includes

automated exposure control, adjustment of the mA and/or kV

according to patient size and/or use of iterative reconstruction

technique.







FINDINGS:

The lung bases are clear. Coronary artery calcification is noted.

The liver and spleen are unremarkable. Tiny cysts are observed in

the posterior right lobe of liver. No adrenal masses are

detected. The pancreas is normal. Imaging of the kidneys reveals

no evidence of hydronephrosis or solid mass. A tiny mid pole

nonobstructing calculus is observed on the left measuring 3.4 mm

in diameter. A tiny cyst is observed in the upper pole the right

kidney. Pedicle screw fusion of the lower lumbar spine is

observed. Extensive interspinous fusion is observed. There is

evidence of a wide lower lumbar laminectomy. Diverticulosis of

the colon is observed without evidence of diverticulitis. No free

fluid is observed. Prostatic hypertrophy is noted. A stone is

observed in the right side of the urinary bladder. The appendix

is identified and is unremarkable.



IMPRESSION:



1. The exam demonstrates evidence of small cyst in the liver and

kidneys.

2. Small nonobstructing stone is observed in the mid left kidney.

3. Extensive uncomplicated diverticulosis of the colon is

observed.

4. Extensive spine surgery.

5. A stone is observed in the right side of the urinary bladder

and marked prostatic hypertrophy is observed.



Electronically signed by:  Oliver Hirsch MD  12/5/2019 3:54 PM

[facility] Workstation: [id number]

## 2019-12-05 NOTE — ED.PDOC
History of Present Illness





- General


Chief Complaint: GI Problem


Time Seen by Provider: 12/05/19 14:20





- History of Present Illness


Initial Comments: 





58 M +pmh presents with wife to ED c/o approximately 1 week of progressive 

nausea with nonbloody nonbilious emesis and abdominal pain. Pt endorses 

associated subjective fever with chills. He informs that since he has been PO 

intolerant, he has been unable to keep his daily Summit 10's or Lyrica down. He d

enies any current associated diarrhea, constipation, back pain, CP, SOB, 

dizziness/lightheadedness, headache, or acute changes in urination. He is 

otherwise healthy with no other signs, symptoms, or complaints. 





Review of Systems





- Review of Systems


Constitutional: States: chills, fever - subjective, other - generalized fatigue


EENTM: Denies: blurred vision, nose congestion, throat pain


Respiratory: Denies: cough, short of breath


Cardiology: Denies: chest pain, edema, palpitations


Gastrointestinal/Abdominal: States: abdominal pain, nausea, vomiting.  Denies: 

constipation, diarrhea


Genitourinary: Denies: dysuria, frequency


Musculoskeletal: Denies: back pain, joint pain, neck pain


Skin: Denies: change in color, rash


Neurological: Denies: headache, weakness


Endocrine: Denies: excessive sweating, increased urine





Past Medical History (General)





- Patient Medical History


Hx Seizures: No


Hx Stroke: No


Hx Dementia: No


Hx Asthma: Yes - As a child


Hx of COPD: No


Hx Cardiac Disorders: No


Hx Congestive Heart Failure: No


Hx Pacemaker: No


Hx Hypertension: No


Hx Thyroid Disease: No


Hx Diabetes: No


Hx Gastroesophageal Reflux: No


Hx Renal Disease: No


Hx Cancer: No


Hx of HIV: No


Hx Hepatitis C: No


Hx MRSA: Yes


MRSA Source:: Wound





- Vaccination History


Hx Tetanus, Diphtheria Vaccination: Yes


Hx Influenza Vaccination: No


Hx Pneumococcal Vaccination: Yes





- Social History


Hx Tobacco Use: No


Hx Chewing Tobacco Use: No


Hx Alcohol Use: Yes - Occas


Hx Substance Use: No


Hx Substance Use Treatment: No


Hx Depression: No


Hx Physical Abuse: No


Hx Emotional Abuse: No


Hx Suspected Abuse: No





- Female History


Patient Pregnant: No





Family Medical History





- Family History


  ** Mother


Family History: No Known


Living Status: Unknown


Hx Cardiac Disease: Yes - dad


Hx Family Cancer: Yes - breast-mom





Physical Exam





- Physical Exam


General Appearance: Alert, Comfortable, No apparent distress, Well Developed, 

Well Groomed, Well Nourished


Eyes, Ears, Nose, Throat Exam: PERRL/EOMI, other - dry oral mucosa


Neck: full range of motion, supple


Respiratory: lungs clear, normal breath sounds, no respiratory distress


Cardiovascular/Chest: regular rate, rhythm, no edema, no JVD, no murmur


Peripheral Pulses: No deficit


Gastrointestinal/Abdominal: normal bowel sounds, soft, other - mild epigastric 

and LLQ TTP, no rebound, no guarding, no peritoneal signs, no CVAT bilaterally


Back Exam: no CVA tenderness


Extremity: normal inspection, no pedal edema


Neurologic: alert, normal mood/affect, oriented x 3


Skin Exam: normal color, warm/dry, other - mild decrease in skin turgor, no rash


Special Observations: Smiling





Progress





- Progress


Progress: 





12/05/19 14:39


Presents with concern for gastritis vs diverticulitis with less concern for UTI;

no clinical findings of pyelonephritis. I will evaluate labs, imaging, provide 

appropriate pharmacotherapy as indicated, and continue to monitor/reassess. 

Disposition will depend on labs, imaging, and pt's clinical course in ED; 

however, discharge home with education/instructions, f/u, and possible RX is 

expected.





Rechecked pt with spouse at bedside. NAD, VSS. Pt is feeling much better. I have

discussed lab findings, imaging findings, my clinical impression, and diagnosis 

of hepatic cysts, urolithiasis in kidney and bladder, diverticulosis without 

diverticulitits, dehydration due to refractory n/v, gastritis, and unintentional

opiate early/minor withdrawal. ED return precautions given. Pt is PO tolerant 

with over half of a large cup of diet coke remaining asymptomatic. Pt and spouse

agree with plan, voice understanding, and all questions answered.








12/05/19 17:17








- Results/Orders


Results/Orders: 





                                Laboratory Tests











  12/05/19 12/05/19 12/05/19





  14:45 14:45 16:00


 


WBC  7.8  


 


RBC  5.18  


 


Hgb  15.9  


 


Hct  46.6  


 


MCV  89.9  


 


MCH  30.8  


 


MCHC  34.2  


 


RDW  14.0  


 


Plt Count  377  


 


MPV  8.3  


 


Absolute Neuts (auto)  5.10  


 


Absolute Lymphs (auto)  1.70  


 


Absolute Monos (auto)  0.80  


 


Absolute Eos (auto)  0.00  


 


Absolute Basos (auto)  0.10  


 


Neutrophils %  65.1  


 


Lymphocytes %  22.3  


 


Monocytes %  10.7 H  


 


Eosinophils %  0.6 L  


 


Basophils %  1.3  


 


Sodium   138 


 


Potassium   3.4 L 


 


Chloride   104 


 


Carbon Dioxide   21 


 


Anion Gap   16.4 


 


BUN   27 H 


 


Creatinine   0.96 


 


BUN/Creatinine Ratio   28.1 H 


 


Random Glucose   157 H 


 


Serum Osmolality   284.0 


 


Calcium   9.5 


 


Total Bilirubin   0.8 


 


AST   29 


 


ALT   19 


 


Alkaline Phosphatase   94 


 


Serum Total Protein   7.7 


 


Albumin   4.7 


 


Globulin   3.0 


 


Albumin/Globulin Ratio   1.6 


 


Lipase   37 


 


Urine Color    Yellow


 


Urine Appearance    Clear


 


Urine pH    5.5


 


Ur Specific Gravity    1.010


 


Urine Protein    Trace


 


Urine Glucose (UA)    Negative


 


Urine Ketones    15 H


 


Urine Blood    Negative


 


Urine Nitrite    Negative


 


Urine Bilirubin    Moderate


 


Urine Urobilinogen    0.2


 


Ur Leukocyte Esterase    Negative


 


Urine RBC    0


 


Urine WBC    0


 


Ur Epithelial Cells    0


 


Urine Bacteria    0














EXAM DESCRIPTION: Abdomen/Pelvis w/Contrast  CLINICAL HISTORY: 58 years Male, 

n/v, po intolerance, f/c, h/o diverticulosis  COMPARISON: 19 mGy 2018  

TECHNIQUE: Transaxial images were obtained without intravenous contrast medium 

with oral contrast media. Sagittal and coronal reconstruction was performed.This

exam was performed according to our departmental dose-optimization program, 

which includes automated exposure control, adjustment of the mA and/or kV 

according to patient size and/or use of iterative reconstruction technique.    





FINDINGS: The lung bases are clear. Coronary artery calcification is noted. The 

liver and spleen are unremarkable. Tiny cysts are observed in the posterior 

right lobe of liver. No adrenal masses are detected. The pancreas is normal. 

Imaging of the kidneys reveals no evidence of hydronephrosis or solid mass. A 

tiny mid pole nonobstructing calculus is observed on the left measuring 3.4 mm 

in diameter. A tiny cyst is observed in the upper pole the right kidney. Pedicle

screw fusion of the lower lumbar spine is observed. Extensive interspinous 

fusion is observed. There is evidence of a wide lower lumbar laminectomy. 

Diverticulosis of the colon is observed without evidence of diverticulitis. No 

free fluid is observed. Prostatic hypertrophy is noted. A stone is observed in 

the right side of the urinary bladder. The appendix is identified and is 

unremarkable.  


IMPRESSION:  1. The exam demonstrates evidence of small cyst in the liver and 

kidneys. 2. Small nonobstructing stone is observed in the mid left kidney. 3. 

Extensive uncomplicated diverticulosis of the colon is observed. 4. Extensive 

spine surgery. 5. A stone is observed in the right side of the urinary bladder 

and marked prostatic hypertrophy is observed.  Electronically signed by: Oliver Hirsch MD 12/5/2019 3:54 PM CST Workstation: [id number]











Departure





- Departure


Clinical Impression: 


 Dehydration, Narcotic dependence, Diverticulosis, Hepatic cyst





Nausea & vomiting


Qualifiers:


 Vomiting type: unspecified Vomiting Intractability: non-intractable Qualified 

Code(s): R11.2 - Nausea with vomiting, unspecified





Gastritis


Qualifiers:


 Gastritis type: unspecified gastritis Chronicity: acute Gastritis bleeding: 

without bleeding Qualified Code(s): K29.00 - Acute gastritis without bleeding





Urolithiasis


Qualifiers:


 Urinary calculus location: other lower urinary tract location Qualified 

Code(s): N21.8 - Other lower urinary tract calculus





Time of Disposition: 16:31


Disposition: Discharge to Home or Self Care


Condition: Fair


Departure Forms:  ED Discharge - Pt. Copy, Patient Portal Self Enrollment


Instructions:  DI for Gastritis, Nausea and Vomiting, Adult (DC), Opioids for 

Short-Term Treatment of Pain


Diet: bland diet


Referrals: 


Oliver Hamm III, MD [Primary Care Provider] - 1-2 Weeks


Prescriptions: 


Ondansetron Odt [Zofran ODT] 4 mg PO QID #12 tab


Tramadol HCl 50 mg PO TID PRN #12 tab


 PRN Reason: Break Thru Pain


Home Medications: 


Ambulatory Orders





HYDROcodone 10MG/APAP 325MG [Summit 10/325] 1 ea PO Q4HR 03/27/17 


Tramadol HCl [Tramadol HCl ER] 100 mg PO BID 03/27/17 


Acetamin W/Cod #3 Tab [Tylenol w/CODEINE #3] 2 ea PO Q6HRS PRN #20 tab 04/29/18 


Aspirin/Acetaminophen/Caffeine [Excedrin Migraine] 1 ea PO PRN PRN 04/29/18 


Escitalopram [Lexapro] 10 mg PO BEDTIME 04/29/18 


Pregabalin [Lyrica] 1 capsule PO BEDTIME 04/29/18 


Sucralfate Suspension [Carafate Suspension] 1 gm PO ACHS #300 ml 04/29/18 


Tamsulosin [Flomax] 0.4 mg PO QD #7 cap 04/29/18 


raNITIdine HCL [Zantac] 150 mg PO BID #60 tab 04/29/18 


Ciprofloxacin [Cipro] 500 mg PO BID #20 tab 05/19/18 


Metronidazole 500 mg PO TID #30 tab 05/19/18 


Ondansetron Odt [Zofran ODT] 4 mg PO QID #12 tab 12/05/19 


Tramadol HCl 50 mg PO TID PRN #12 tab 12/05/19

## 2020-01-30 ENCOUNTER — HOSPITAL ENCOUNTER (OUTPATIENT)
Dept: HOSPITAL 39 - MRI | Age: 60
End: 2020-01-30
Payer: COMMERCIAL

## 2020-01-30 DIAGNOSIS — M19.031: ICD-10-CM

## 2020-01-30 DIAGNOSIS — M19.041: Primary | ICD-10-CM

## 2020-01-30 DIAGNOSIS — Z98.1: ICD-10-CM

## 2020-01-30 DIAGNOSIS — Z98.890: ICD-10-CM

## 2020-01-31 NOTE — MRI
Study: MRI of the Right Hand. 



Indication: INFLAMMATORY ARTHRITIS  



Technique: Multiplanar, multi sequence MRI of the right hand was

obtained without intravenous contrast. 



Comparison: Radiographs September 5, 2019.



Findings:



Extensive postsurgical changes of the wrist. Prior resection of

the scaphoid with several millimetric osseous fragments at its

expected location. 4 corner solid osseous fusion of the lunate,

triquetrum, capitate, and hamate. Maceration TFC with no normal

tissue present. Severe osteoarthritis of the distal radioulnar

joint and radiolunate joint noted. Moderate changes of the first

CMC joint. No acute fracture of the hand or wrist. 



Scattered moderate to severe osteoarthritic changes of the MCP

joints noted and most pronounced at the second through fifth

joints where there is multifocal grade 4 chondral loss and

prominent areas of subchondral cystic change. No acute tear of

the radial or ulnar collateral ligaments. No definitive osseous

erosions. No acute fracture of the flexor or extensor tendons.

Mild to moderate osteoarthritic changes throughout the PIP and

DIP joints of the hand as well and most pronounced at the fourth

PIP joint.



Impression:



Post surgical changes of prior scaphoid resection and 4 corner

fusion of the lunate, triquetrum, hamate, capitate.



Advanced osteoarthritic changes throughout the right hand and

wrist as detailed above. No acute fracture or osseous erosion

identified.



No acute ligament tear or tendon rupture.



Electronically signed by:  Timo Palma MD  1/31/2020 8:03 AM [facility]

Workstation: [id number]

## 2020-04-14 ENCOUNTER — HOSPITAL ENCOUNTER (OUTPATIENT)
Dept: HOSPITAL 39 - GMAL | Age: 60
End: 2020-04-14
Attending: FAMILY MEDICINE
Payer: COMMERCIAL

## 2020-04-14 DIAGNOSIS — M25.462: Primary | ICD-10-CM

## 2020-09-03 ENCOUNTER — HOSPITAL ENCOUNTER (OUTPATIENT)
Dept: HOSPITAL 39 - RAD | Age: 60
End: 2020-09-03
Attending: ORTHOPAEDIC SURGERY
Payer: COMMERCIAL

## 2020-09-03 DIAGNOSIS — M89.9: ICD-10-CM

## 2020-09-03 DIAGNOSIS — M25.462: ICD-10-CM

## 2020-09-03 DIAGNOSIS — N32.89: Primary | ICD-10-CM

## 2020-09-03 DIAGNOSIS — Z98.890: ICD-10-CM

## 2020-09-03 NOTE — RAD
EXAM DESCRIPTION: Knee,Left Complete



CLINICAL HISTORY: 59 years Male, PAIN IN LEFT KNEE



COMPARISON: 2/1/2019



Findings: Five views/radiographs



Location: Left knee



No dislocation. Linear lucency undermining the lateral tibial

plateau without definitive intra-articular extent. Severe medial

compartment narrowing. Small scattered osteophytes. Moderate

joint effusion.



IMPRESSION:

Linear lucency undermining the lateral tibial plateau without

definitive intra-articular extent. This finding may reflect a

nondisplaced fracture. Recommend CT or MRI for further

evaluation.



Moderate left knee joint effusion.



Electronically signed by:  Jackson Whitney MD  9/3/2020 5:18 PM

CDT Workstation: [id number]

## 2020-09-03 NOTE — RAD
EXAM DESCRIPTION: 



Pelvis



CLINICAL HISTORY: 



HIP PAIN LEFT



COMPARISON: 



8 February 2019



TECHNIQUE: 



AP pelvis



FINDINGS: 



Extensive lumbar spine surgery is observed. The exam reveals a

stellate calcification overlying the right side of the pelvis

measuring 8 mm in diameter. The appearance is that of a bladder

stone no fracturing is detected. No significant degenerative

arthritis is seen.



IMPRESSION: 





1. Extensive lumbar spine surgery is observed.

2. Enlarging right-sided bladder stone is observed.



Electronically signed by:  Oliver Hirsch MD  9/3/2020 5:21 PM

CDT Workstation: [id number]

## 2020-11-02 ENCOUNTER — HOSPITAL ENCOUNTER (OUTPATIENT)
Dept: HOSPITAL 39 - LAB | Age: 60
End: 2020-11-02
Attending: ORTHOPAEDIC SURGERY
Payer: COMMERCIAL

## 2020-11-02 DIAGNOSIS — Z01.818: Primary | ICD-10-CM
